# Patient Record
Sex: MALE | Race: WHITE
[De-identification: names, ages, dates, MRNs, and addresses within clinical notes are randomized per-mention and may not be internally consistent; named-entity substitution may affect disease eponyms.]

---

## 2018-07-18 ENCOUNTER — HOSPITAL ENCOUNTER (EMERGENCY)
Dept: HOSPITAL 26 - MED | Age: 43
Discharge: HOME | End: 2018-07-18
Payer: COMMERCIAL

## 2018-07-18 VITALS — BODY MASS INDEX: 24.59 KG/M2 | HEIGHT: 69 IN | WEIGHT: 166 LBS

## 2018-07-18 VITALS — DIASTOLIC BLOOD PRESSURE: 84 MMHG | SYSTOLIC BLOOD PRESSURE: 129 MMHG

## 2018-07-18 VITALS — SYSTOLIC BLOOD PRESSURE: 129 MMHG | DIASTOLIC BLOOD PRESSURE: 75 MMHG

## 2018-07-18 DIAGNOSIS — L23.89: Primary | ICD-10-CM

## 2018-07-18 DIAGNOSIS — J45.909: ICD-10-CM

## 2018-07-18 PROCEDURE — 96372 THER/PROPH/DIAG INJ SC/IM: CPT

## 2018-07-18 PROCEDURE — 99283 EMERGENCY DEPT VISIT LOW MDM: CPT

## 2018-07-18 PROCEDURE — 81002 URINALYSIS NONAUTO W/O SCOPE: CPT

## 2018-07-18 NOTE — NUR
Patient discharged with v/s stable. Written and verbal after care instructions 
given and explained. 

Patient alert, oriented and verbalized understanding of instructions. 
Ambulatory with steady gait. All questions addressed prior to discharge. ID 
band removed. Patient advised to follow up with PMD. Rx of BENADRYL ALLERGY 
25MG KAPGEL, PREDNISONE 50MG given. Patient educated on indication of 
medication including possible reaction and side effects. Opportunity to ask 
questions provided and answered.

## 2018-07-18 NOTE — NUR
44 Y/O M PRESENTS TO THE ED W/C/O, "RASH." PT STATES, "I USED A NEW PERFUME ON 
MONDAY AND SINCE THEN MY THROAT HAS BEEN SWOLLEN AND I HAVE A RASH." UPON 
ASSESSMENT RED BUMPS NOTED TO NECK AND CHEST. PT O2 SAT: 98% ON THE MONITOR. NO 
S/S OF DISTRESS NOTED. RR EVEN/UNLAOBORED. PT DENIES N/V/D AND PAIN AT THIS 
TIME; SKIN IS INTACT, PINK/WARM/DRY; AAOX4, PERRL, WITH EVEN AND STEADY GAIT; 
LUNGS CLEAR BL, BREATHING UNLABORED; HR EVEN AND REGULAR, BL PERIPHERAL PULSES 
PRESENT; BS ACTIVE X4, NO TENDERNESS TO PALPATION, NO HEPATOSPLENOMEGALLY 
PALPATED, RESONANT TO PERCUSSION; PT DENIES ANY FEVER, CP, SOB, OR COUGH AT 
THIS TIME; PT STATES 0/10 PAIN AT THIS TIME; VSS; PATIENT POSITIONED FOR 
COMFORT; HOB ELEVATED; BEDRAILS UP X2; BED DOWN.

PMH: ASTHMA IN WINTER PER PT

BENRIE